# Patient Record
Sex: MALE | Race: ASIAN
[De-identification: names, ages, dates, MRNs, and addresses within clinical notes are randomized per-mention and may not be internally consistent; named-entity substitution may affect disease eponyms.]

---

## 2019-12-19 ENCOUNTER — HOSPITAL ENCOUNTER (INPATIENT)
Dept: HOSPITAL 54 - ER | Age: 84
LOS: 4 days | Discharge: NURSING, REHAB OR LONG-TERM CARE | DRG: 542 | End: 2019-12-23
Attending: NURSE PRACTITIONER | Admitting: NURSE PRACTITIONER
Payer: MEDICARE

## 2019-12-19 VITALS — SYSTOLIC BLOOD PRESSURE: 120 MMHG | DIASTOLIC BLOOD PRESSURE: 71 MMHG

## 2019-12-19 VITALS — SYSTOLIC BLOOD PRESSURE: 110 MMHG | DIASTOLIC BLOOD PRESSURE: 76 MMHG

## 2019-12-19 VITALS — SYSTOLIC BLOOD PRESSURE: 133 MMHG | DIASTOLIC BLOOD PRESSURE: 81 MMHG

## 2019-12-19 VITALS — HEIGHT: 67 IN | WEIGHT: 138 LBS | BODY MASS INDEX: 21.66 KG/M2

## 2019-12-19 VITALS — DIASTOLIC BLOOD PRESSURE: 97 MMHG | SYSTOLIC BLOOD PRESSURE: 120 MMHG

## 2019-12-19 DIAGNOSIS — R53.1: ICD-10-CM
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
## 2019-12-19 DIAGNOSIS — F03.90: ICD-10-CM

## 2019-12-19 DIAGNOSIS — E11.22: ICD-10-CM

## 2019-12-19 DIAGNOSIS — Z98.61: ICD-10-CM

## 2019-12-19 DIAGNOSIS — D69.6: ICD-10-CM

## 2019-12-19 DIAGNOSIS — S40.022A: ICD-10-CM
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
## 2019-12-19 DIAGNOSIS — E83.51: ICD-10-CM

## 2019-12-19 DIAGNOSIS — N18.3: ICD-10-CM

## 2019-12-19 DIAGNOSIS — D62: ICD-10-CM

## 2019-12-19 DIAGNOSIS — M80.022A: Primary | ICD-10-CM
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
## 2019-12-19 DIAGNOSIS — Z79.4: ICD-10-CM

## 2019-12-19 DIAGNOSIS — N17.0: ICD-10-CM

## 2019-12-19 DIAGNOSIS — I25.10: ICD-10-CM

## 2019-12-19 DIAGNOSIS — M85.80: ICD-10-CM

## 2019-12-19 DIAGNOSIS — E78.5: ICD-10-CM

## 2019-12-19 DIAGNOSIS — S20.212A: ICD-10-CM

## 2019-12-19 DIAGNOSIS — N40.0: ICD-10-CM

## 2019-12-19 DIAGNOSIS — Z86.73: ICD-10-CM

## 2019-12-19 DIAGNOSIS — D72.829: ICD-10-CM

## 2019-12-19 DIAGNOSIS — G92: ICD-10-CM

## 2019-12-19 DIAGNOSIS — E83.39: ICD-10-CM

## 2019-12-19 DIAGNOSIS — I12.9: ICD-10-CM

## 2019-12-19 DIAGNOSIS — I95.1: ICD-10-CM

## 2019-12-19 LAB
ALBUMIN SERPL BCP-MCNC: 3.5 G/DL (ref 3.4–5)
ALP SERPL-CCNC: 68 U/L (ref 46–116)
ALT SERPL W P-5'-P-CCNC: 27 U/L (ref 12–78)
APPEARANCE UR: (no result)
AST SERPL W P-5'-P-CCNC: 19 U/L (ref 15–37)
BASOPHILS # BLD AUTO: 0 /CMM (ref 0–0.2)
BASOPHILS NFR BLD AUTO: 0.3 % (ref 0–2)
BILIRUB DIRECT SERPL-MCNC: 0.2 MG/DL (ref 0–0.2)
BILIRUB SERPL-MCNC: 0.6 MG/DL (ref 0.2–1)
BILIRUB UR QL STRIP: NEGATIVE
BUN SERPL-MCNC: 27 MG/DL (ref 7–18)
CALCIUM SERPL-MCNC: 9.4 MG/DL (ref 8.5–10.1)
CHLORIDE SERPL-SCNC: 104 MMOL/L (ref 98–107)
CO2 SERPL-SCNC: 29 MMOL/L (ref 21–32)
COLOR UR: (no result)
CREAT SERPL-MCNC: 1.2 MG/DL (ref 0.6–1.3)
EOSINOPHIL NFR BLD AUTO: 0.1 % (ref 0–6)
GLUCOSE SERPL-MCNC: 237 MG/DL (ref 74–106)
GLUCOSE UR STRIP-MCNC: (no result) MG/DL
HCT VFR BLD AUTO: 38 % (ref 39–51)
HGB BLD-MCNC: 12.6 G/DL (ref 13.5–17.5)
HGB UR QL STRIP: (no result) ERY/UL
KETONES UR STRIP-MCNC: NEGATIVE MG/DL
LEUKOCYTE ESTERASE UR QL STRIP: NEGATIVE
LYMPHOCYTES NFR BLD AUTO: 19.7 % (ref 20–44)
LYMPHOCYTES NFR BLD AUTO: 2.4 /CMM (ref 0.8–4.8)
MCHC RBC AUTO-ENTMCNC: 33 G/DL (ref 31–36)
MCV RBC AUTO: 97 FL (ref 80–96)
MONOCYTES NFR BLD AUTO: 0.6 /CMM (ref 0.1–1.3)
MONOCYTES NFR BLD AUTO: 4.7 % (ref 2–12)
NEUTROPHILS # BLD AUTO: 9.3 /CMM (ref 1.8–8.9)
NEUTROPHILS NFR BLD AUTO: 75.2 % (ref 43–81)
NITRITE UR QL STRIP: NEGATIVE
PH UR STRIP: 5 [PH] (ref 5–8)
PLATELET # BLD AUTO: 170 /CMM (ref 150–450)
POTASSIUM SERPL-SCNC: 3.8 MMOL/L (ref 3.5–5.1)
PROT SERPL-MCNC: 6.6 G/DL (ref 6.4–8.2)
PROT UR QL STRIP: 30 MG/DL
RBC # BLD AUTO: 3.94 MIL/UL (ref 4.5–6)
RBC #/AREA URNS HPF: (no result) /HPF (ref 0–2)
SODIUM SERPL-SCNC: 140 MMOL/L (ref 136–145)
UROBILINOGEN UR STRIP-MCNC: 0.2 EU/DL
WBC #/AREA URNS HPF: (no result) /HPF (ref 0–3)
WBC NRBC COR # BLD AUTO: 12.3 K/UL (ref 4.3–11)

## 2019-12-19 PROCEDURE — A4216 STERILE WATER/SALINE, 10 ML: HCPCS

## 2019-12-19 PROCEDURE — G0378 HOSPITAL OBSERVATION PER HR: HCPCS

## 2019-12-19 PROCEDURE — A9563 P32 NA PHOSPHATE: HCPCS

## 2019-12-19 RX ADMIN — INSULIN HUMAN PRN UNIT: 100 INJECTION, SOLUTION PARENTERAL at 22:18

## 2019-12-19 RX ADMIN — Medication SCH EACH: at 21:36

## 2019-12-19 RX ADMIN — Medication SCH EACH: at 21:32

## 2019-12-19 RX ADMIN — QUETIAPINE SCH MG: 25 TABLET, FILM COATED ORAL at 21:37

## 2019-12-19 RX ADMIN — SODIUM CHLORIDE PRN MLS/HR: 9 INJECTION, SOLUTION INTRAVENOUS at 22:55

## 2019-12-19 NOTE — NUR
TELE RN NOTES



Received pt in bed awake with family at bedside.  pt noted with hematuria md aware.  pt A/O 
x1 with period of forgetfulness.  bed alarm on.  pt on tele monitor with hr 87. safety 
measures in place with bed in lowest locked position with side rails up x2.  call light 
within reach.  will continue to monitor.

## 2019-12-19 NOTE — NUR
BIB [facility] [address],GLF ON THE WAY TO THE BATHROOM AT AN ADULT  CENTER, (+)GROSS 
DEFORMITY,LUE,(+) AIR SPLINT,100 MG FENTANYL GIVEN BY EMS. PT AAOX3, VSS. RR 
EVEN & UNLABORED. DENIES CP, SOB, DIZZINESS, N/V @ THIS TIME. PT SEEN & EVAL'D 
BY DR. SANCHEZ. DAUGHTER @ BS ASSISTING W/ TRANSLATION. WILL CONT TO MONITOR.

## 2019-12-19 NOTE — NUR
TELE RN NOTES

Received pt from ER with dx of Syncope episode and s/p fall , under the care of CRESCENCIO JERNIGAN NP 
. A/O x1 with period of forgetfulness bed alarm place. Place on tele monitor with hr 87. IV 
HL RT FA, in place and flush well. Daughter at bedside per paul Camp to start cardiac 
diet. Noted rodney cath in place with hematuria. Kept clean, dry. Plan of care discussed with 
pt and family. Bed alarm in place.Side rails up for safety, bed in lowest position. Per RN 
np she'll going to see pt soon. Needs to place admission order. Will continue to monitor

## 2019-12-20 VITALS — DIASTOLIC BLOOD PRESSURE: 70 MMHG | SYSTOLIC BLOOD PRESSURE: 146 MMHG

## 2019-12-20 VITALS — SYSTOLIC BLOOD PRESSURE: 116 MMHG | DIASTOLIC BLOOD PRESSURE: 34 MMHG

## 2019-12-20 VITALS — DIASTOLIC BLOOD PRESSURE: 48 MMHG | SYSTOLIC BLOOD PRESSURE: 91 MMHG

## 2019-12-20 VITALS — SYSTOLIC BLOOD PRESSURE: 104 MMHG | DIASTOLIC BLOOD PRESSURE: 68 MMHG

## 2019-12-20 VITALS — SYSTOLIC BLOOD PRESSURE: 110 MMHG | DIASTOLIC BLOOD PRESSURE: 76 MMHG

## 2019-12-20 LAB
BASOPHILS # BLD AUTO: 0 /CMM (ref 0–0.2)
BASOPHILS NFR BLD AUTO: 0 % (ref 0–2)
BUN SERPL-MCNC: 34 MG/DL (ref 7–18)
CALCIUM SERPL-MCNC: 8.2 MG/DL (ref 8.5–10.1)
CHLORIDE SERPL-SCNC: 108 MMOL/L (ref 98–107)
CHOLEST SERPL-MCNC: 79 MG/DL (ref ?–200)
CO2 SERPL-SCNC: 25 MMOL/L (ref 21–32)
CREAT SERPL-MCNC: 1.6 MG/DL (ref 0.6–1.3)
EOSINOPHIL NFR BLD AUTO: 0 % (ref 0–6)
GLUCOSE SERPL-MCNC: 172 MG/DL (ref 74–106)
HCT VFR BLD AUTO: 27 % (ref 39–51)
HDLC SERPL-MCNC: 44 MG/DL (ref 40–60)
HGB BLD-MCNC: 9.1 G/DL (ref 13.5–17.5)
LDLC SERPL DIRECT ASSAY-MCNC: 29 MG/DL (ref 0–99)
LYMPHOCYTES NFR BLD AUTO: 1.1 /CMM (ref 0.8–4.8)
LYMPHOCYTES NFR BLD AUTO: 7.4 % (ref 20–44)
MAGNESIUM SERPL-MCNC: 2.5 MG/DL (ref 1.8–2.4)
MCHC RBC AUTO-ENTMCNC: 34 G/DL (ref 31–36)
MCV RBC AUTO: 96 FL (ref 80–96)
MONOCYTES NFR BLD AUTO: 1.2 /CMM (ref 0.1–1.3)
MONOCYTES NFR BLD AUTO: 8.2 % (ref 2–12)
NEUTROPHILS # BLD AUTO: 12.5 /CMM (ref 1.8–8.9)
NEUTROPHILS NFR BLD AUTO: 84.4 % (ref 43–81)
PHOSPHATE SERPL-MCNC: 4.6 MG/DL (ref 2.5–4.9)
PLATELET # BLD AUTO: 133 /CMM (ref 150–450)
POTASSIUM SERPL-SCNC: 4.5 MMOL/L (ref 3.5–5.1)
RBC # BLD AUTO: 2.79 MIL/UL (ref 4.5–6)
SODIUM SERPL-SCNC: 144 MMOL/L (ref 136–145)
TRIGL SERPL-MCNC: 65 MG/DL (ref 30–150)
WBC NRBC COR # BLD AUTO: 14.8 K/UL (ref 4.3–11)

## 2019-12-20 RX ADMIN — PANCRELIPASE SCH EACH: 4200; 24600; 14200 CAPSULE, DELAYED RELEASE ORAL at 19:16

## 2019-12-20 RX ADMIN — Medication SCH EACH: at 22:19

## 2019-12-20 RX ADMIN — QUETIAPINE SCH MG: 25 TABLET, FILM COATED ORAL at 21:54

## 2019-12-20 RX ADMIN — ATORVASTATIN CALCIUM SCH MG: 10 TABLET, FILM COATED ORAL at 21:54

## 2019-12-20 RX ADMIN — INSULIN HUMAN PRN UNIT: 100 INJECTION, SOLUTION PARENTERAL at 22:00

## 2019-12-20 RX ADMIN — Medication SCH EACH: at 19:15

## 2019-12-20 RX ADMIN — Medication SCH EACH: at 09:36

## 2019-12-20 RX ADMIN — PANCRELIPASE SCH EACH: 4200; 24600; 14200 CAPSULE, DELAYED RELEASE ORAL at 16:27

## 2019-12-20 RX ADMIN — PANTOPRAZOLE SODIUM SCH MG: 40 TABLET, DELAYED RELEASE ORAL at 09:36

## 2019-12-20 RX ADMIN — SODIUM CHLORIDE PRN MLS/HR: 9 INJECTION, SOLUTION INTRAVENOUS at 20:54

## 2019-12-20 RX ADMIN — Medication SCH EACH: at 14:36

## 2019-12-20 NOTE — NUR
RN TELE NOTES







- HOLD INSULIN. PATIENT DID NOT EAT FOOD NPO SINCE MIDNIGHT 



FAMILY AT BEDSIDE

## 2019-12-20 NOTE — NUR
RN TELE NOTES 







 PATIENT IS RESTING IN BED, BUT EASILY WOKEN WITH NAME AND TOUCH. FAMILY IS AT BEDSIDE ( 
DAUGHTER) INFORMED HER ABOUT TREATMENT PLAN AND MEDICATION MANAGEMENT. PATIENT IS ON 2L 
OXYGEN PATIENT IS SATURATING WELL AT 96% . PATIENT IS A/O X1  PATIENT IS ON TELE MONITOR 
PATIENT IS SR AT 60-90'S . PATIENT HAS LEFT EDEMA SHOULD, WITH PURPLE AND YELLOW  ANTERIOR 
AND MEDIAN AND LATERAL.PATIENT HAS A SLING ON   PATIENT HAS DIAPER AND HAMOMNDS HAMMONDS IS INTACT 
AND PATENT INTACT. THE COLOR OF THE URINE IS TEA COLOR. PATIENT HAS RFA 22# PATIENT HAS 75 
ML/HR ( NS )  . BED LOCKED AND LOWEST POSITION, CALL LIGHT WITH IN REACH. ALL SAFETY 
MEASURES IMPLEMENTED  PER HOSPITAL PROTOCOL

## 2019-12-20 NOTE — NUR
RN TELE NOTES CLOSING 





PATIENT A/O X1 . PATIENT FAMILY AT BEDSIDE.NO PAIN   PATIENT SHOWS NO SIGNS OF ACUTE 
DISTRESS, NO SOB, EVEN AND UNLABORED BREATHING . BED LOCKED AND LOWEST POSITION. CALL LIGHT 
WITH IN REACH . ALL SAFETY MEASURE IMPLEMENTED PER HOSPITAL POLICY

## 2019-12-20 NOTE — NUR
WOUND CARE CONSULT: PT PRESENTS WITH LEFT SHOULDER SWELLING AND DISCOLORATION, LEFT KNEE DRY 
ABRASION AND NOSE DRY ABRASION, PRESENT ON ADMISSION. PT REFUSED TO TURN FOR FULL SKIN 
ASSESSMENT OF BACK AND BUTTOCKS. RECOMMENDATIONS MADE FOR SKIN PROTECTION. DISCUSSED WITH 
NURSING STAFF. PT ON CARMEN ISOFLEX LOW AIRLOSS BED. WILL SEE BRITTON KAUFFMAN NOTED. 
CURRENT ARY SCORE IS 15. MD IN AGREEMENT WITH PLAN OF CARE. 

-------------------------------------------------------------------------------

Addendum: 12/20/19 at 0939 by SID VIGIL [id number]

-------------------------------------------------------------------------------

Amended: Links added.

## 2019-12-20 NOTE — NUR
TELE RN NOTES



pt in bed resting with family at bedside.  pt A/O x1 with period of forgetfulness.  bed 
alarm on.  pt on tele monitor with hr 75.  pt with rfa #22g patent and intact infusing ns 
@75cc/hr.  safety measures in place with bed in lowest locked position with side rails up 
x2.  turned pt q2 hours throughout shift.  pt kept clean, dry, and comfortable.  call light 
within reach.  will endorse to oncoming nurse for radha.

## 2019-12-21 VITALS — SYSTOLIC BLOOD PRESSURE: 130 MMHG | DIASTOLIC BLOOD PRESSURE: 71 MMHG

## 2019-12-21 VITALS — DIASTOLIC BLOOD PRESSURE: 59 MMHG | SYSTOLIC BLOOD PRESSURE: 127 MMHG

## 2019-12-21 VITALS — DIASTOLIC BLOOD PRESSURE: 69 MMHG | SYSTOLIC BLOOD PRESSURE: 128 MMHG

## 2019-12-21 VITALS — DIASTOLIC BLOOD PRESSURE: 63 MMHG | SYSTOLIC BLOOD PRESSURE: 129 MMHG

## 2019-12-21 VITALS — DIASTOLIC BLOOD PRESSURE: 18 MMHG | SYSTOLIC BLOOD PRESSURE: 131 MMHG

## 2019-12-21 VITALS — DIASTOLIC BLOOD PRESSURE: 62 MMHG | SYSTOLIC BLOOD PRESSURE: 128 MMHG

## 2019-12-21 VITALS — SYSTOLIC BLOOD PRESSURE: 130 MMHG | DIASTOLIC BLOOD PRESSURE: 76 MMHG

## 2019-12-21 VITALS — SYSTOLIC BLOOD PRESSURE: 127 MMHG | DIASTOLIC BLOOD PRESSURE: 59 MMHG

## 2019-12-21 VITALS — DIASTOLIC BLOOD PRESSURE: 54 MMHG | SYSTOLIC BLOOD PRESSURE: 134 MMHG

## 2019-12-21 VITALS — DIASTOLIC BLOOD PRESSURE: 68 MMHG | SYSTOLIC BLOOD PRESSURE: 126 MMHG

## 2019-12-21 LAB
BASOPHILS # BLD AUTO: 0 /CMM (ref 0–0.2)
BASOPHILS NFR BLD AUTO: 0.2 % (ref 0–2)
BUN SERPL-MCNC: 30 MG/DL (ref 7–18)
CALCIUM SERPL-MCNC: 7.7 MG/DL (ref 8.5–10.1)
CHLORIDE SERPL-SCNC: 111 MMOL/L (ref 98–107)
CO2 SERPL-SCNC: 26 MMOL/L (ref 21–32)
CREAT SERPL-MCNC: 1.2 MG/DL (ref 0.6–1.3)
EOSINOPHIL NFR BLD AUTO: 0.1 % (ref 0–6)
GLUCOSE SERPL-MCNC: 134 MG/DL (ref 74–106)
HCT VFR BLD AUTO: 23 % (ref 39–51)
HGB BLD-MCNC: 7.6 G/DL (ref 13.5–17.5)
LYMPHOCYTES NFR BLD AUTO: 1 /CMM (ref 0.8–4.8)
LYMPHOCYTES NFR BLD AUTO: 9.2 % (ref 20–44)
LYMPHOCYTES NFR BLD MANUAL: 9 % (ref 16–48)
MAGNESIUM SERPL-MCNC: 2.2 MG/DL (ref 1.8–2.4)
MCHC RBC AUTO-ENTMCNC: 34 G/DL (ref 31–36)
MCV RBC AUTO: 97 FL (ref 80–96)
MONOCYTES NFR BLD AUTO: 0.9 /CMM (ref 0.1–1.3)
MONOCYTES NFR BLD AUTO: 8.6 % (ref 2–12)
MONOCYTES NFR BLD MANUAL: 5 % (ref 0–11)
NEUTROPHILS # BLD AUTO: 8.6 /CMM (ref 1.8–8.9)
NEUTROPHILS NFR BLD AUTO: 81.9 % (ref 43–81)
NEUTS SEG NFR BLD MANUAL: 86 % (ref 42–76)
PHOSPHATE SERPL-MCNC: 2.7 MG/DL (ref 2.5–4.9)
PLATELET # BLD AUTO: 92 /CMM (ref 150–450)
POTASSIUM SERPL-SCNC: 4.4 MMOL/L (ref 3.5–5.1)
RBC # BLD AUTO: 2.31 MIL/UL (ref 4.5–6)
SODIUM SERPL-SCNC: 144 MMOL/L (ref 136–145)
WBC NRBC COR # BLD AUTO: 10.5 K/UL (ref 4.3–11)

## 2019-12-21 PROCEDURE — 30233N1 TRANSFUSION OF NONAUTOLOGOUS RED BLOOD CELLS INTO PERIPHERAL VEIN, PERCUTANEOUS APPROACH: ICD-10-PCS | Performed by: NURSE PRACTITIONER

## 2019-12-21 RX ADMIN — PANTOPRAZOLE SODIUM SCH MG: 40 TABLET, DELAYED RELEASE ORAL at 08:12

## 2019-12-21 RX ADMIN — PANCRELIPASE SCH EACH: 4200; 24600; 14200 CAPSULE, DELAYED RELEASE ORAL at 17:52

## 2019-12-21 RX ADMIN — Medication SCH EACH: at 17:35

## 2019-12-21 RX ADMIN — Medication SCH EACH: at 12:07

## 2019-12-21 RX ADMIN — INSULIN HUMAN PRN UNIT: 100 INJECTION, SOLUTION PARENTERAL at 17:53

## 2019-12-21 RX ADMIN — DUTASTERIDE SCH MG: 0.5 CAPSULE, LIQUID FILLED ORAL at 08:12

## 2019-12-21 RX ADMIN — INSULIN HUMAN PRN UNIT: 100 INJECTION, SOLUTION PARENTERAL at 12:24

## 2019-12-21 RX ADMIN — SODIUM CHLORIDE PRN MLS/HR: 9 INJECTION, SOLUTION INTRAVENOUS at 05:01

## 2019-12-21 RX ADMIN — MAGNESIUM HYDROXIDE PRN ML: 400 SUSPENSION ORAL at 17:52

## 2019-12-21 RX ADMIN — DUTASTERIDE SCH MG: 0.5 CAPSULE, LIQUID FILLED ORAL at 09:01

## 2019-12-21 RX ADMIN — ATORVASTATIN CALCIUM SCH MG: 10 TABLET, FILM COATED ORAL at 22:16

## 2019-12-21 RX ADMIN — PANCRELIPASE SCH EACH: 4200; 24600; 14200 CAPSULE, DELAYED RELEASE ORAL at 12:25

## 2019-12-21 RX ADMIN — INSULIN HUMAN PRN UNIT: 100 INJECTION, SOLUTION PARENTERAL at 22:38

## 2019-12-21 RX ADMIN — SODIUM CHLORIDE PRN MLS/HR: 9 INJECTION, SOLUTION INTRAVENOUS at 13:27

## 2019-12-21 RX ADMIN — QUETIAPINE SCH MG: 25 TABLET, FILM COATED ORAL at 22:16

## 2019-12-21 RX ADMIN — Medication SCH EACH: at 22:38

## 2019-12-21 RX ADMIN — Medication SCH EACH: at 07:44

## 2019-12-21 RX ADMIN — SODIUM CHLORIDE PRN MLS/HR: 9 INJECTION, SOLUTION INTRAVENOUS at 22:17

## 2019-12-21 RX ADMIN — PANCRELIPASE SCH EACH: 4200; 24600; 14200 CAPSULE, DELAYED RELEASE ORAL at 08:12

## 2019-12-21 NOTE — NUR
TELE RN NOTES

CALLED [facility]TH[facility] AND LEFT A MSG REQUESTED BEARDEN BRACE FOR LUE.

## 2019-12-21 NOTE — NUR
TELE RN NOTES

PATIENT IS IN BED A/OX 4 DAUGHTER AT BED SIDE. DENIES ANY PAIN WHEN SUPINE. ALL NEEDS 
ATTENDED. ALL MEDICATION ADMINISTRATED. CALL LIGHT WITHIN REACH , BED AT THE LOWEST POSITION 
LOCKED. ENDORSED TO NIGHT SHIFT NURSE FOR TRACE.

## 2019-12-21 NOTE — NUR
TELE RN NOTES

PATIENT IN BED SLEEPING , ABLE TO WAKE UP WHEN NAME CALLED. FAMILY MEMBER AT BED SIDE. NO 
SOB OR DISCOMFORT NOTED AT THIS TIME. CALL LIGHT WITHIN REACH BED AT THE LOWEST POSITION. 
WILL CONTINUE TO MONITOR.

## 2019-12-21 NOTE — NUR
RN NOTES PM SHIFT

PATIENT ALERT AND AWAKE, STABLE ON ROOM AIR, NO COMPLAIN OF PAIN AT REST, PAIN DURING 
REPOSITIONING, LEFT SHOULDER SUPPORTED DURING REPOSITIONING, LEFT SHOULDER SECURED WITH 
SLING, NWB, DENIES NUMBNESS, ABLE TO MOVE FINGERS, SKIN WARM TO TOUCH, BRUISES NOTED DUE TO 
FALL, LAB REPORTED CORTISOL LEVEL OF 29,DR. LEUNG NOTIFIED, NO NEW ORDER, HAMMONDS CATHETER 
DRAINING WELL WITH CLEAR YELLOW URINE, PER ORTHO, NO SURGICAL INTERVENTION PLANNED, WILL 
NEED BEARDEN BRACE, FOLLOW UP WITH DR. MATHIS IN 2 WEEKS. SON AT THE BEDSIDE.

## 2019-12-22 VITALS — DIASTOLIC BLOOD PRESSURE: 60 MMHG | SYSTOLIC BLOOD PRESSURE: 120 MMHG

## 2019-12-22 VITALS — DIASTOLIC BLOOD PRESSURE: 57 MMHG | SYSTOLIC BLOOD PRESSURE: 121 MMHG

## 2019-12-22 VITALS — DIASTOLIC BLOOD PRESSURE: 66 MMHG | SYSTOLIC BLOOD PRESSURE: 133 MMHG

## 2019-12-22 VITALS — DIASTOLIC BLOOD PRESSURE: 69 MMHG | SYSTOLIC BLOOD PRESSURE: 138 MMHG

## 2019-12-22 VITALS — SYSTOLIC BLOOD PRESSURE: 103 MMHG | DIASTOLIC BLOOD PRESSURE: 45 MMHG

## 2019-12-22 LAB
BASOPHILS # BLD AUTO: 0 /CMM (ref 0–0.2)
BASOPHILS NFR BLD AUTO: 0.2 % (ref 0–2)
BUN SERPL-MCNC: 22 MG/DL (ref 7–18)
CALCIUM SERPL-MCNC: 7.1 MG/DL (ref 8.5–10.1)
CHLORIDE SERPL-SCNC: 112 MMOL/L (ref 98–107)
CO2 SERPL-SCNC: 25 MMOL/L (ref 21–32)
CREAT SERPL-MCNC: 1 MG/DL (ref 0.6–1.3)
EOSINOPHIL NFR BLD AUTO: 0.2 % (ref 0–6)
GLUCOSE SERPL-MCNC: 153 MG/DL (ref 74–106)
HCT VFR BLD AUTO: 23 % (ref 39–51)
HGB BLD-MCNC: 8 G/DL (ref 13.5–17.5)
LYMPHOCYTES NFR BLD AUTO: 0.9 /CMM (ref 0.8–4.8)
LYMPHOCYTES NFR BLD AUTO: 12.7 % (ref 20–44)
LYMPHOCYTES NFR BLD MANUAL: 8 % (ref 16–48)
MAGNESIUM SERPL-MCNC: 2.5 MG/DL (ref 1.8–2.4)
MCHC RBC AUTO-ENTMCNC: 34 G/DL (ref 31–36)
MCV RBC AUTO: 93 FL (ref 80–96)
MONOCYTES NFR BLD AUTO: 0.6 /CMM (ref 0.1–1.3)
MONOCYTES NFR BLD AUTO: 8.8 % (ref 2–12)
MONOCYTES NFR BLD MANUAL: 4 % (ref 0–11)
NEUTROPHILS # BLD AUTO: 5.8 /CMM (ref 1.8–8.9)
NEUTROPHILS NFR BLD AUTO: 78.1 % (ref 43–81)
NEUTS SEG NFR BLD MANUAL: 88 % (ref 42–76)
PHOSPHATE SERPL-MCNC: 1.9 MG/DL (ref 2.5–4.9)
PLATELET # BLD AUTO: 85 /CMM (ref 150–450)
POTASSIUM SERPL-SCNC: 3.9 MMOL/L (ref 3.5–5.1)
RBC # BLD AUTO: 2.51 MIL/UL (ref 4.5–6)
SODIUM SERPL-SCNC: 143 MMOL/L (ref 136–145)
WBC NRBC COR # BLD AUTO: 7.4 K/UL (ref 4.3–11)

## 2019-12-22 RX ADMIN — ATORVASTATIN CALCIUM SCH MG: 10 TABLET, FILM COATED ORAL at 21:07

## 2019-12-22 RX ADMIN — QUETIAPINE SCH MG: 25 TABLET, FILM COATED ORAL at 21:07

## 2019-12-22 RX ADMIN — SODIUM CHLORIDE PRN MLS/HR: 9 INJECTION, SOLUTION INTRAVENOUS at 18:42

## 2019-12-22 RX ADMIN — INSULIN HUMAN PRN UNIT: 100 INJECTION, SOLUTION PARENTERAL at 17:08

## 2019-12-22 RX ADMIN — Medication SCH EACH: at 07:26

## 2019-12-22 RX ADMIN — PANCRELIPASE SCH EACH: 4200; 24600; 14200 CAPSULE, DELAYED RELEASE ORAL at 12:53

## 2019-12-22 RX ADMIN — PANCRELIPASE SCH EACH: 4200; 24600; 14200 CAPSULE, DELAYED RELEASE ORAL at 08:02

## 2019-12-22 RX ADMIN — PANCRELIPASE SCH EACH: 4200; 24600; 14200 CAPSULE, DELAYED RELEASE ORAL at 17:10

## 2019-12-22 RX ADMIN — DUTASTERIDE SCH MG: 0.5 CAPSULE, LIQUID FILLED ORAL at 08:21

## 2019-12-22 RX ADMIN — PANTOPRAZOLE SODIUM SCH MG: 40 TABLET, DELAYED RELEASE ORAL at 07:29

## 2019-12-22 RX ADMIN — SODIUM CHLORIDE PRN MLS/HR: 9 INJECTION, SOLUTION INTRAVENOUS at 05:30

## 2019-12-22 RX ADMIN — Medication SCH EACH: at 21:07

## 2019-12-22 RX ADMIN — Medication SCH EACH: at 11:37

## 2019-12-22 RX ADMIN — INSULIN HUMAN PRN UNIT: 100 INJECTION, SOLUTION PARENTERAL at 07:30

## 2019-12-22 RX ADMIN — Medication SCH EACH: at 17:04

## 2019-12-22 RX ADMIN — INSULIN HUMAN PRN UNIT: 100 INJECTION, SOLUTION PARENTERAL at 20:24

## 2019-12-22 RX ADMIN — INSULIN HUMAN PRN UNIT: 100 INJECTION, SOLUTION PARENTERAL at 11:41

## 2019-12-22 NOTE — NUR
TELE RN CLOSING NOTE



PT AWAKE IN BED, ALERT AND ORIENTED X 1, Kazakh SPEAKING, DAUGHTER BY BEDSIDE FOR 
TRANSLATION, ON ROOM AIR, SATURATING WELL, RESPIRATIONS EVEN AND UNLABORED, NO SIGNS OF 
RESPIRATORY DISTRESS NOTED. HAMMONDS CATHETER INTACT, PATENT, DRAINING CLEAR YELLOW URINE. IV 
SITE ON RIGHT FOREARM G22 INTACT, PATENT, NS INFUSING 125CC/HR, NO SIGNS OF INFILTRATION 
NOTED. SINUS RHYTHM ON TELE MONITOR, HR 87. BED IN LOW POSITION, LOCKED, CALL LIGHT WITHIN 
REACH. PROVIDED SAFETY AND COMFORT TO PT THROUGHOUT SHIFT, ALL DUE MEDS GIVEN. WILL ENDORSE 
TO NOC SHIFT NURSE.

## 2019-12-22 NOTE — NUR
TELE RN OPENING NOTE



RECEIVED REPORT FROM NOC SHIFT NURSE, PT ASLEEP IN BED,ON ROOM AIR, SATURATING WELL, 
RESPIRATIONS EVEN AND UNLABORED, NO SIGNS OF RESPIRATORY DISTRESS NOTED. HAMMONDS CATHETER 
INTACT, PATENT, DRAINING CLEAR YELLOW URINE. IV SITE ON RIGHT FOREARM G22 INTACT, PATENT, NS 
INFUSING 125CC/HR, NO SIGNS OF INFILTRATION NOTED. BED IN LOW POSITION, LOCKED, CALL LIGHT 
WITHIN REACH.

## 2019-12-22 NOTE — NUR
RN NOTE

PT AWAKE IN BED IN SUPINE POSITION, ALERT AND ORIENTED X 1, Faroese SPEAKING, FAMILY AT 
BEDSIDE, ON ROOM AIR, SATURATING WELL, RESPIRATIONS EVEN AND UNLABORED, NO SIGNS OF 
RESPIRATORY DISTRESS NOTED. HAMMONDS CATHETER PATENT AND DRAINING CLEAR YELLOW URINE. WITH IV 
SITE ON RIGHT FOREARM G22 PATENT WITH NS INFUSING 125CC/HR, SINUS RHYTHM ON TELE MONITOR, HR 
87. BED IN LOW POSITION, LOCKED, CALL LIGHT WITHIN REACH WILL MONITOR.

## 2019-12-22 NOTE — NUR
CALLED [facility]  LEFT VOICEMAIL. OPEN MONDAY-FRIDAY ONLY.\

FAMILY NOTIFIED.

## 2019-12-23 VITALS — SYSTOLIC BLOOD PRESSURE: 121 MMHG | DIASTOLIC BLOOD PRESSURE: 62 MMHG

## 2019-12-23 VITALS — SYSTOLIC BLOOD PRESSURE: 140 MMHG | DIASTOLIC BLOOD PRESSURE: 70 MMHG

## 2019-12-23 VITALS — DIASTOLIC BLOOD PRESSURE: 65 MMHG | SYSTOLIC BLOOD PRESSURE: 123 MMHG

## 2019-12-23 VITALS — DIASTOLIC BLOOD PRESSURE: 78 MMHG | SYSTOLIC BLOOD PRESSURE: 158 MMHG

## 2019-12-23 VITALS — DIASTOLIC BLOOD PRESSURE: 72 MMHG | SYSTOLIC BLOOD PRESSURE: 133 MMHG

## 2019-12-23 LAB
BASOPHILS # BLD AUTO: 0 /CMM (ref 0–0.2)
BASOPHILS NFR BLD AUTO: 0.2 % (ref 0–2)
BUN SERPL-MCNC: 18 MG/DL (ref 7–18)
CALCIUM SERPL-MCNC: 7 MG/DL (ref 8.5–10.1)
CHLORIDE SERPL-SCNC: 114 MMOL/L (ref 98–107)
CO2 SERPL-SCNC: 24 MMOL/L (ref 21–32)
CREAT SERPL-MCNC: 1 MG/DL (ref 0.6–1.3)
EOSINOPHIL NFR BLD AUTO: 1.3 % (ref 0–6)
GLUCOSE SERPL-MCNC: 154 MG/DL (ref 74–106)
HCT VFR BLD AUTO: 23 % (ref 39–51)
HGB BLD-MCNC: 7.8 G/DL (ref 13.5–17.5)
LYMPHOCYTES NFR BLD AUTO: 0.8 /CMM (ref 0.8–4.8)
LYMPHOCYTES NFR BLD AUTO: 13.6 % (ref 20–44)
MAGNESIUM SERPL-MCNC: 2.5 MG/DL (ref 1.8–2.4)
MCHC RBC AUTO-ENTMCNC: 34 G/DL (ref 31–36)
MCV RBC AUTO: 95 FL (ref 80–96)
MONOCYTES NFR BLD AUTO: 0.4 /CMM (ref 0.1–1.3)
MONOCYTES NFR BLD AUTO: 7.7 % (ref 2–12)
NEUTROPHILS # BLD AUTO: 4.3 /CMM (ref 1.8–8.9)
NEUTROPHILS NFR BLD AUTO: 77.2 % (ref 43–81)
PHOSPHATE SERPL-MCNC: 2.9 MG/DL (ref 2.5–4.9)
PLATELET # BLD AUTO: 100 /CMM (ref 150–450)
POTASSIUM SERPL-SCNC: 3.9 MMOL/L (ref 3.5–5.1)
RBC # BLD AUTO: 2.43 MIL/UL (ref 4.5–6)
SODIUM SERPL-SCNC: 145 MMOL/L (ref 136–145)
WBC NRBC COR # BLD AUTO: 5.6 K/UL (ref 4.3–11)

## 2019-12-23 RX ADMIN — Medication SCH EACH: at 11:54

## 2019-12-23 RX ADMIN — PANCRELIPASE SCH EACH: 4200; 24600; 14200 CAPSULE, DELAYED RELEASE ORAL at 08:16

## 2019-12-23 RX ADMIN — MAGNESIUM HYDROXIDE PRN ML: 400 SUSPENSION ORAL at 13:46

## 2019-12-23 RX ADMIN — PANCRELIPASE SCH EACH: 4200; 24600; 14200 CAPSULE, DELAYED RELEASE ORAL at 12:28

## 2019-12-23 RX ADMIN — SODIUM CHLORIDE PRN MLS/HR: 9 INJECTION, SOLUTION INTRAVENOUS at 02:51

## 2019-12-23 RX ADMIN — INSULIN HUMAN PRN UNIT: 100 INJECTION, SOLUTION PARENTERAL at 12:34

## 2019-12-23 RX ADMIN — PANTOPRAZOLE SODIUM SCH MG: 40 TABLET, DELAYED RELEASE ORAL at 08:14

## 2019-12-23 RX ADMIN — Medication SCH EACH: at 08:08

## 2019-12-23 RX ADMIN — DUTASTERIDE SCH MG: 0.5 CAPSULE, LIQUID FILLED ORAL at 08:16

## 2019-12-23 RX ADMIN — SODIUM CHLORIDE PRN MLS/HR: 9 INJECTION, SOLUTION INTRAVENOUS at 11:14

## 2019-12-23 NOTE — NUR
RN NOTES

1400-PATIENT REPOSIITONED, BEARDEN BRACE IN PLACE, PILLOW PLACED UNDER THE LOWER ARM FOR 
SUPPORT.GOOD HAND  ON THE LEFT ARM.

1535-PATIENT DAUGHTER STATES PATIENT IS  " ACTING UP" BECAUSE "HE WANTS TO DO BM".ORDER 
OBTAINED FOR DIGITAL EXAM/DISIMPACTION, DONE, NO BM ON THE LOWER PART OF THE RECTUM, 
DAUGHTER INFORMED, LAXATIVE ADMINISTERED AS ORDERED, DAUGHTER INFORMED

## 2019-12-23 NOTE — NUR
RERE RED

1615-REPORT GIVEN TO RERE GALICIA FOR FURTHER CARE.PATIENT RESTING, FAMILY AT BEDSIDE.PATIENT 
HAS GOOD  ON BOTH HANDS.AROM EXRECISES ON THE RIGHT FINGERS ENCOURAGED

1715-PATIENT PICKED UP BY TRANSPORT PERSONNEL, REPORT GIVEN

## 2019-12-23 NOTE — NUR
RN NOTES

 0730-RECEIVED PATIENT FROM RN. PATIENT AWAKE, ALERT. DAUGHTER AT BEDSIDE. LEFT ARM ON 
SLING, POSITIONED FOR COMFORT. LEFT HAND WITH STRONG . PILLOW SUPPORT PLACED. 

0930-SEEN BY MAURILIO JOHNSON, SHE DISCUSSED PALN OF CARE WITH PATIENT DAUGHTER. AWAITING FOR 
BEARDEN BRACE, FOLLOWED UP WITH COMPANY EARLIER.

1100-BEARDEN BRACE PLACED SAFELY BY PT, DAUGHTER WAS INSTRUCTED ON HOW TO PLACE VIA 
DEMONSTRATION. PATIENT SAT AT EDGE OF BED WITH ASSIST. BP-153/80; HR-84.PATIENT STATES HE 
FEEELS DIZZY, PER FAMILY, PATIENT HAS BEEN LAYING ON BED FOR SEVERAL DAYS AND THIS IS THE 
FIRST TIME HE SITS UP.

## 2019-12-23 NOTE — NUR
RN NOTE

PT SLEEPING IN BED IN SEMI SOTO'S POSITION WITHOUT INDICATIONS OF ACUTE DISTRESS OR 
DISCOMFORT. ON ROOM AIR. FAMILY AT BED SIDE. KEPT CLEAN AND DRY. SLING IN PLACE. IVF RUNNING 
AT 125ML/HR AS ORDERED. HAMMONDS CATHETER IN PLACE AND PATENT DRAINING CLEAR YELLOW URINE.  BED 
ALARM IN PLACE. CALL LIGHT WITHIN REACH. WILL MONITOR.

## 2019-12-23 NOTE — NUR
RN NOTE

IV SITE ON RIGHT FOREARM NOTED TO REDDENED WITH SWELLING. SITE REMOVED. INSERTED NEW IV SITE 
ON RIGHT WRIST 22G. PT TOLERATED WELL.

## 2020-12-17 NOTE — NUR
RN NOTES

PM SHIFT

PATIENT IS ALERT AND AWAKE, ROOM AIR, NO COMPLAIN OF PAIN AT REST, Maltese SPEAKING ONLY, 
LEFT SHOULDER SUPPORTED WITH SLING, NOTED BRUISES DUE TO FALL, HAMMONDS CATHETER DRAINING WELL 
WITH DARK YELLOW URINE OUTPUT, FAMILY MEMBERS AT THE BEDSIDE, WILL CONTINUE TO MONITOR 100

## 2022-07-11 NOTE — NUR
[address]  [address] [place] [address]  Phone:  [phone]          Name: Meagan Randolph  : 1951    Chief Complaint   Patient presents with    Medicare AWV       HPI:     Meagan Randolph is a 70 y.o. male who presents today for follow up of T2DM, hypertension, and hyperlipidemia. His last HbA1c was 7.4% in March. He'd like to get the [employer] sensor to help monitor his BGs more closely as it's been increasing. Lab Results   Component Value Date    CHOL 135 2018    TRIG 119 2018    HDL 36 2020    LDLCALC 67 2020     Lab Results   Component Value Date    ALT 18 02/15/2021    AST 16 02/15/2021        Lab Results   Component Value Date/Time     02/15/2021 10:39 AM    K 4.8 02/15/2021 10:39 AM    CL 99 02/15/2021 10:39 AM    CO2 32 02/15/2021 10:39 AM    BUN 22 02/15/2021 10:39 AM    CREATININE 0.9 02/15/2021 10:39 AM    GLUCOSE 190 02/15/2021 10:39 AM    CALCIUM 11.0 2022 09:37 AM          Current Outpatient Medications:     Continuous Blood Gluc  (FREESTYLE KAMI 14 DAY READER) SARA, Dispense 1 reader device. , Disp: 1 each, Rfl: 3    Continuous Blood Gluc Sensor (FREESTYLE KAMI 14 DAY SENSOR) MISC, Change sensor every 14 days. , Disp: 7 each, Rfl: 3    pravastatin (PRAVACHOL) 20 MG tablet, Take 1 tablet by mouth daily, Disp: 90 tablet, Rfl: 1    hydroCHLOROthiazide (MICROZIDE) 12.5 MG capsule, TAKE 1 CAPSULE BY MOUTH EVERY DAY, Disp: 90 capsule, Rfl: 1    glimepiride (AMARYL) 4 MG tablet, TAKE 1 TABLET BY MOUTH IN THE MORNING AND 1/2 TABLET IN THE EVENING WITH SUPPER, Disp: 135 tablet, Rfl: 1    metFORMIN (GLUCOPHAGE) 500 MG tablet, TAKE 1 TABLET BY MOUTH ONCE DAILY IN THE EVENING WITH 1000 MG TABLET FOR A TOTAL OF 1500 MG/DAY., Disp: 90 tablet, Rfl: 0    vitamin B-12 (CYANOCOBALAMIN) 1000 MCG tablet, Take 1,000 mcg by mouth daily, Disp: , Rfl:     magnesium oxide (MAG-OX) 400 MG tablet, TAKE 1 TABLET BY MOUTH EVERY DAY, RN TELE NOTES 





NO INSULIN COVERAGE. and fever. HENT: Positive for congestion, rhinorrhea and trouble swallowing (feels like he has phlegm in his throat). Negative for sore throat and voice change. Respiratory: Negative for cough and shortness of breath. Cardiovascular: Negative for chest pain and palpitations. Gastrointestinal: Positive for diarrhea. Negative for blood in stool, constipation, nausea and vomiting. Genitourinary: Negative for hematuria. Psychiatric/Behavioral: Negative for dysphoric mood. The patient is not nervous/anxious. Objective:     /80 (Site: Left Upper Arm, Position: Sitting, Cuff Size: Large Adult)   Pulse 67   Temp 97.9 °F (36.6 °C) (Temporal)   Resp 16   Ht 5' 10\" (1.778 m)   Wt 193 lb (87.5 kg)   SpO2 99%   BMI 27.69 kg/m²     Physical Exam  Vitals and nursing note reviewed. Constitutional:       Appearance: He is well-developed. HENT:      Head: Normocephalic and atraumatic. Mouth/Throat:      Mouth: Mucous membranes are moist.      Pharynx: No oropharyngeal exudate or posterior oropharyngeal erythema. Eyes:      Conjunctiva/sclera: Conjunctivae normal.   Cardiovascular:      Rate and Rhythm: Normal rate and regular rhythm. Heart sounds: Normal heart sounds. Pulmonary:      Effort: Pulmonary effort is normal. No respiratory distress. Breath sounds: Normal breath sounds. Abdominal:      General: Bowel sounds are normal.      Palpations: Abdomen is soft. Tenderness: There is no abdominal tenderness. Musculoskeletal:      Cervical back: Normal range of motion and neck supple. Skin:     General: Skin is warm and dry. Neurological:      Mental Status: He is alert and oriented to person, place, and time. Motor: No abnormal muscle tone. Assessment/Plan:     Jessica Short was seen today for medicare awv.     Diagnoses and all orders for this visit:    Type 2 diabetes mellitus with hyperglycemia, with long-term current use of insulin ([address]  -     His last HbA1c was 7.4% in March. Will try to get the [employer] sensor to help monitor his BGs more closely as it's been increasing. A healthy diet and routine physical activity encouraged. -    Continuous Blood Gluc  (FREESTYLE KAMI 14 DAY READER) SARA; Dispense 1 reader device. -     Continuous Blood Gluc Sensor (FREESTYLE KAMI 14 DAY SENSOR) MISC; Change sensor every 14 days. -     Comprehensive Metabolic Panel; Future    Essential hypertension  -     Blood pressure has been controlled so will continue on current medications. -     Comprehensive Metabolic Panel; Future    Pure hypercholesterolemia  -     Will check labs today. A healthy diet and routine physical activity encouraged. -     Lipid Panel; Future      Return in 6 months (on 1/12/2023) for diabetes, hypertension.     Electronically signed by Mary Sandoval MD on 7/12/2022 at 9:42 AM